# Patient Record
Sex: MALE | Race: WHITE
[De-identification: names, ages, dates, MRNs, and addresses within clinical notes are randomized per-mention and may not be internally consistent; named-entity substitution may affect disease eponyms.]

---

## 2020-11-17 ENCOUNTER — HOSPITAL ENCOUNTER (EMERGENCY)
Dept: HOSPITAL 41 - JD.ED | Age: 35
Discharge: HOME | End: 2020-11-17
Payer: SELF-PAY

## 2020-11-17 VITALS — DIASTOLIC BLOOD PRESSURE: 130 MMHG | SYSTOLIC BLOOD PRESSURE: 194 MMHG | HEART RATE: 82 BPM

## 2020-11-17 DIAGNOSIS — Z88.5: ICD-10-CM

## 2020-11-17 DIAGNOSIS — K02.9: Primary | ICD-10-CM

## 2020-11-17 NOTE — EDM.PDOC
ED HPI GENERAL MEDICAL PROBLEM





- General


Chief Complaint: ENT Problem


Stated Complaint: TOOTHACHE


Time Seen by Provider: 11/17/20 19:22





- History of Present Illness


INITIAL COMMENTS - FREE TEXT/NARRATIVE: 





34-year-old male presents the emergency room with a fairly severe tooth ache.





This is been going on for the last couple of days but really got bad today.  Is 

got a couple wisdom teeth her molars on the right side upper and lower that are 

just causing him all sorts of fits.  He has really significant pain.  He has 

some leftover antibiotics at home that he try to take he cannot recall what they

are.  Patient denies fevers or chills no breathing difficulties no shortness of 

breath.


  ** Right Oral/Mouth


Pain Score (Numeric/FACES): 10





- Related Data


                                    Allergies











Allergy/AdvReac Type Severity Reaction Status Date / Time


 


codeine AdvReac  Vomiting Verified 11/17/20 19:12











Home Meds: 


                                    Home Meds





Acetaminophen/HYDROcodone [Norco 325-5 MG] 1 - 2 tab PO Q6H #15 tablet 11/17/20 

[Rx]


Amoxicillin 500 mg PO TID #30 tab 11/17/20 [Rx]











Past Medical History





- Past Health History


Medical/Surgical History: Denies Medical/Surgical History





Social & Family History





- Family History


Family Medical History: No Pertinent Family History





- Caffeine Use


Caffeine Use: Reports: Coffee, Soda





ED ROS ENT





- Review of Systems


Review Of Systems: See Below


Constitutional: Reports: No Symptoms


HEENT: Reports: Dental Pain.  Denies: Ear Pain, Nose Pain, Rhinitis, Sinus 

Problem


Respiratory: Reports: No Symptoms


Cardiovascular: Reports: No Symptoms


GI/Abdominal: Reports: No Symptoms





ED EXAM, ENT





- Physical Exam


Exam: See Below


Exam Limited By: No Limitations


General Appearance: Alert, No Apparent Distress


Ears: Normal External Exam, Normal Canal, Hearing Grossly Normal, Normal TMs


Nose: Normal Inspection, Normal Mucousa, No Blood


Mouth/Throat: Normal Inspection, Normal Gums, Normal Lips, Normal Oropharynx, 

Normal Teeth, Dental Pain (Right rear molars both upper and lower on the lower 

posterior may be a wisdom tooth he has significant decay and gum swelling in the

 area nothing obvious to drain at this time.)


Head: Atraumatic, Normocephalic


Neck: Normal Inspection, Supple, Non-Tender, Full Range of Motion.  No: 

Lymphadenopathy (L), Lymphadenopathy (R)


Respiratory/Chest: No Respiratory Distress, Lungs Clear, Normal Breath Sounds


Cardiovascular: Regular Rate, Rhythm, No Edema, No Murmur





Course





- Vital Signs


Last Recorded V/S: 





                                Last Vital Signs











Temp  36.2 C   11/17/20 19:14


 


Pulse  82   11/17/20 19:14


 


Resp  18   11/17/20 19:14


 


BP  194/130 H  11/17/20 19:14


 


Pulse Ox  100   11/17/20 19:14














- Re-Assessments/Exams


Free Text/Narrative Re-Assessment/Exam: 





11/17/20 19:25


Blood pressure is quite elevated at this time could be due to the pain.  I did 

discuss this with the patient and he really does not want this followed or 

worked up at this time.  We will discharge him with amoxicillin and Norco No. 15

 1 or 2 every 6hr as needed.





Departure





- Departure


Time of Disposition: 19:26


Disposition: Home, Self-Care 01


Clinical Impression: 


 Pain due to dental caries








- Discharge Information


Referrals: 


PCP,None [Primary Care Provider] - 


Additional Instructions: 


To the emergency room with any questions problems or worsening symptoms.





You must follow-up with your regular healthcare provider for recheck of your 

blood pressure this week.  [phone] is the number to the hospital clinic.





Follow-up with a dentist as soon as you can this is how your teeth will get 

fixed.  The more you come into the emergency room just for antibiotics you 

encourage the development of resistant bacterial organisms.





This time we will give you some amoxicillin, this is an antibiotic take 1 3 

times a day for 10 days.  I have also given you some Norco take 1 or 2 every 6 

hours as needed for pain.  Allow 12 hours after using this medication before 

driving or returning to work.  Your prescriptions have been sent electronically 

to [facility] in [place].





Tylenol does work of taken on a regular basis.  However keep track daily Tylenol

intake it should not exceed 4000 mg in a 24-hour period each pain pill that we 

have prescribed you also contains a little bit of Tylenol there is 325 mg and 

each 1 of those.





Sepsis Event Note (ED)





- Evaluation


Sepsis Screening Result: No Definite Risk





- Focused Exam


Vital Signs: 





                                   Vital Signs











  Temp Pulse Resp BP Pulse Ox


 


 11/17/20 19:14  36.2 C  82  18  194/130 H  100